# Patient Record
Sex: FEMALE | Race: BLACK OR AFRICAN AMERICAN | Employment: FULL TIME | URBAN - METROPOLITAN AREA
[De-identification: names, ages, dates, MRNs, and addresses within clinical notes are randomized per-mention and may not be internally consistent; named-entity substitution may affect disease eponyms.]

---

## 2020-04-17 ENCOUNTER — APPOINTMENT (OUTPATIENT)
Dept: CT IMAGING | Age: 35
End: 2020-04-17
Attending: EMERGENCY MEDICINE
Payer: SELF-PAY

## 2020-04-17 ENCOUNTER — APPOINTMENT (OUTPATIENT)
Dept: GENERAL RADIOLOGY | Age: 35
End: 2020-04-17
Attending: EMERGENCY MEDICINE
Payer: SELF-PAY

## 2020-04-17 ENCOUNTER — HOSPITAL ENCOUNTER (EMERGENCY)
Age: 35
Discharge: HOME | End: 2020-04-17
Attending: EMERGENCY MEDICINE
Payer: SELF-PAY

## 2020-04-17 VITALS
DIASTOLIC BLOOD PRESSURE: 82 MMHG | HEART RATE: 85 BPM | RESPIRATION RATE: 16 BRPM | SYSTOLIC BLOOD PRESSURE: 110 MMHG | OXYGEN SATURATION: 99 % | BODY MASS INDEX: 36.08 KG/M2 | TEMPERATURE: 98.4 F | WEIGHT: 179 LBS | HEIGHT: 59 IN

## 2020-04-17 DIAGNOSIS — S16.1XXA CERVICAL STRAIN, ACUTE, INITIAL ENCOUNTER: ICD-10-CM

## 2020-04-17 DIAGNOSIS — S09.90XA TRAUMATIC INJURY OF HEAD, INITIAL ENCOUNTER: Primary | ICD-10-CM

## 2020-04-17 PROCEDURE — 72040 X-RAY EXAM NECK SPINE 2-3 VW: CPT

## 2020-04-17 PROCEDURE — 72125 CT NECK SPINE W/O DYE: CPT

## 2020-04-17 PROCEDURE — 99283 EMERGENCY DEPT VISIT LOW MDM: CPT

## 2020-04-17 PROCEDURE — 81003 URINALYSIS AUTO W/O SCOPE: CPT

## 2020-04-17 PROCEDURE — 70450 CT HEAD/BRAIN W/O DYE: CPT

## 2020-04-17 NOTE — ED TRIAGE NOTES
Pt ambulatory to triage without complications and wearing a mask. Pt states she fell on Wednesday at work and went to employee health doctor but had no imaging and states she still has pain in back of head where hit and has frontal HA but denies any other s/sx and denies LOC at time of fall. Pt denies fever, cough, SOB, or recent travel.

## 2020-04-17 NOTE — LETTER
[address] [facility] EMERGENCY DEPT 
[address] [facility] 
[phone] Work/School Note Date: 4/17/2020 To Whom It May concern: 
 
Ely Jansen was seen and treated today in the emergency room by the following provider(s): 
Attending Provider: Damaris Rosen MD.   
 
 
 
Sincerely, Padmini Martin RN

## 2020-04-17 NOTE — ED NOTES
I have reviewed discharge instructions with the patient. The patient verbalized understanding. Patient left ED via Discharge Method: ambulatory to Home via POV. Opportunity for questions and clarification provided. Patient given 0 scripts. To continue your aftercare when you leave the hospital, you may receive an automated call from our care team to check in on how you are doing. This is a free service and part of our promise to provide the best care and service to meet your aftercare needs.  If you have questions, or wish to unsubscribe from this service please call [phone]. Thank you for Choosing our [facility] Emergency Department.

## 2020-04-17 NOTE — ED PROVIDER NOTES
Patient states she was sitting in a rocking chair at work on her break. She rocked back and hit head on brick wall, chair then slipped back and went backward to floor and she hit her head on the cement floor. No LOC. She scraped her left hand during the fall. Last tetanus one year ago. No weakness or numbness. No bowel or bladder dysfunction. No memory issues. Complains of headache in occipital and bilateral temporal areas. No nausea or vomiting. Also has bilateral neck pain. Upper back pain. Denies any prior neck injury or pain. No past medical history on file. No past surgical history on file. No family history on file.     Social History     Socioeconomic History    Marital status: SINGLE     Spouse name: Not on file    Number of children: Not on file    Years of education: Not on file    Highest education level: Not on file   Occupational History    Not on file   Social Needs    Financial resource strain: Not on file    Food insecurity     Worry: Not on file     Inability: Not on file    Transportation needs     Medical: Not on file     Non-medical: Not on file   Tobacco Use    Smoking status: Not on file   Substance and Sexual Activity    Alcohol use: Not on file    Drug use: Not on file    Sexual activity: Not on file   Lifestyle    Physical activity     Days per week: Not on file     Minutes per session: Not on file    Stress: Not on file   Relationships    Social connections     Talks on phone: Not on file     Gets together: Not on file     Attends Congregation service: Not on file     Active member of club or organization: Not on file     Attends meetings of clubs or organizations: Not on file     Relationship status: Not on file    Intimate partner violence     Fear of current or ex partner: Not on file     Emotionally abused: Not on file     Physically abused: Not on file     Forced sexual activity: Not on file   Other Topics Concern    Not on file   Social History Narrative    Not on file         ALLERGIES: Patient has no known allergies. Review of Systems   Constitutional: Negative for chills and fever. HENT: Negative. Respiratory: Negative. Cardiovascular: Negative. Gastrointestinal: Negative. Genitourinary: Negative. Musculoskeletal: Positive for back pain and neck pain. Skin: Positive for wound. Neurological: Positive for headaches. Negative for facial asymmetry, speech difficulty, weakness and numbness. Hematological: Negative. Vitals:    04/17/20 1157   BP: 128/82   Pulse: 85   Resp: 16   Temp: 98.4 °F (36.9 °C)   SpO2: 96%   Weight: 81.2 kg (179 lb)   Height: 4' 11\" (1.499 m)            Physical Exam  Nursing note reviewed. Constitutional:       Appearance: Normal appearance. She is well-developed. HENT:      Head: Normocephalic. Comments: No scalp wound or bony step off or swelling palpated     Nose: Nose normal.   Eyes:      Conjunctiva/sclera: Conjunctivae normal.      Pupils: Pupils are equal, round, and reactive to light. Neck:      Musculoskeletal: Normal range of motion and neck supple. Vascular: No JVD. Cardiovascular:      Rate and Rhythm: Normal rate and regular rhythm. Heart sounds: Normal heart sounds. Pulmonary:      Effort: Pulmonary effort is normal.      Breath sounds: Normal breath sounds. Musculoskeletal: Normal range of motion. General: No tenderness. Comments: No midline cervical spine tenderness or swelling. FROM of shoulders. Skin:     General: Skin is warm and dry. Neurological:      General: No focal deficit present. Mental Status: She is alert and oriented to person, place, and time. Motor: No weakness.    Psychiatric:         Behavior: Behavior normal.          MDM  Number of Diagnoses or Management Options  Cervical strain, acute, initial encounter:   Traumatic injury of head, initial encounter:   Diagnosis management comments: Head trauma  Cervical strain  X ray C spine - straightening of curve, calcific density C 5 anterior, no soft tissue swelling  CT head and neck - no acute process  Results and instructions discussed with patient  Follow up with PCP  Return with new or worse symptoms.          Amount and/or Complexity of Data Reviewed  Tests in the radiology section of CPT®: ordered and reviewed    Patient Progress  Patient progress: stable         Procedures